# Patient Record
Sex: MALE | Race: OTHER | URBAN - METROPOLITAN AREA
[De-identification: names, ages, dates, MRNs, and addresses within clinical notes are randomized per-mention and may not be internally consistent; named-entity substitution may affect disease eponyms.]

---

## 2020-08-28 NOTE — PATIENT DISCUSSION
[facility]liver Visual Field 120 point screen: I have reviewed the visual field on both eyes which show the following limitation of a peripheral visual field: significant visual field loss, bilateral.

## 2020-08-28 NOTE — PATIENT DISCUSSION
Recommend consultation to Dr. Quan Chopra for management and treatment of Tepezza for Grave's Disease.

## 2022-11-01 ENCOUNTER — ESTABLISHED PATIENT (OUTPATIENT)
Dept: URBAN - METROPOLITAN AREA CLINIC 8 | Facility: CLINIC | Age: 77
End: 2022-11-01

## 2022-11-01 DIAGNOSIS — E11.9: ICD-10-CM

## 2022-11-01 PROCEDURE — 92014 COMPRE OPH EXAM EST PT 1/>: CPT

## 2022-11-01 ASSESSMENT — VISUAL ACUITY
OU_SC: 20/25
OD_SC: 20/25
OS_SC: 20/25

## 2022-11-01 ASSESSMENT — TONOMETRY
OS_IOP_MMHG: 12
OD_IOP_MMHG: 14

## 2023-11-14 ENCOUNTER — ESTABLISHED PATIENT (OUTPATIENT)
Dept: URBAN - METROPOLITAN AREA CLINIC 8 | Facility: CLINIC | Age: 78
End: 2023-11-14

## 2023-11-14 DIAGNOSIS — H52.223: ICD-10-CM

## 2023-11-14 DIAGNOSIS — H02.831: ICD-10-CM

## 2023-11-14 DIAGNOSIS — E11.9: ICD-10-CM

## 2023-11-14 PROCEDURE — 92014 COMPRE OPH EXAM EST PT 1/>: CPT

## 2023-11-14 PROCEDURE — 92015 DETERMINE REFRACTIVE STATE: CPT

## 2023-11-14 ASSESSMENT — VISUAL ACUITY
OD_CC: 20/20
OS_CC: 20/25
OU_CC: 20/20

## 2023-11-14 ASSESSMENT — TONOMETRY
OS_IOP_MMHG: 14
OD_IOP_MMHG: 12

## 2024-11-14 ENCOUNTER — COMPREHENSIVE EXAM (OUTPATIENT)
Facility: LOCATION | Age: 79
End: 2024-11-14

## 2024-11-14 DIAGNOSIS — H02.834: ICD-10-CM

## 2024-11-14 DIAGNOSIS — H52.223: ICD-10-CM

## 2024-11-14 DIAGNOSIS — E11.9: ICD-10-CM

## 2024-11-14 DIAGNOSIS — H02.831: ICD-10-CM

## 2024-11-14 PROCEDURE — 92014 COMPRE OPH EXAM EST PT 1/>: CPT

## 2024-11-14 PROCEDURE — 92015 DETERMINE REFRACTIVE STATE: CPT
